# Patient Record
Sex: MALE | Race: WHITE | HISPANIC OR LATINO | URBAN - METROPOLITAN AREA
[De-identification: names, ages, dates, MRNs, and addresses within clinical notes are randomized per-mention and may not be internally consistent; named-entity substitution may affect disease eponyms.]

---

## 2022-06-05 ENCOUNTER — EMERGENCY (EMERGENCY)
Facility: HOSPITAL | Age: 27
LOS: 1 days | Discharge: HOME | End: 2022-06-05
Attending: EMERGENCY MEDICINE | Admitting: EMERGENCY MEDICINE
Payer: COMMERCIAL

## 2022-06-05 VITALS
RESPIRATION RATE: 19 BRPM | SYSTOLIC BLOOD PRESSURE: 126 MMHG | HEIGHT: 68 IN | OXYGEN SATURATION: 98 % | TEMPERATURE: 98 F | WEIGHT: 190.04 LBS | DIASTOLIC BLOOD PRESSURE: 73 MMHG | HEART RATE: 60 BPM

## 2022-06-05 DIAGNOSIS — M79.89 OTHER SPECIFIED SOFT TISSUE DISORDERS: ICD-10-CM

## 2022-06-05 DIAGNOSIS — S69.91XA UNSPECIFIED INJURY OF RIGHT WRIST, HAND AND FINGER(S), INITIAL ENCOUNTER: ICD-10-CM
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
## 2022-06-05 PROCEDURE — 99053 MED SERV 10PM-8AM 24 HR FAC: CPT

## 2022-06-05 PROCEDURE — 99283 EMERGENCY DEPT VISIT LOW MDM: CPT

## 2022-06-05 PROCEDURE — 73130 X-RAY EXAM OF HAND: CPT | Mod: 26,RT
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
## 2022-06-06 RX ORDER — ACETAMINOPHEN 500 MG
650 TABLET ORAL ONCE
Refills: 0 | Status: COMPLETED | OUTPATIENT
Start: 2022-06-06 | End: 2022-06-06

## 2022-06-06 RX ORDER — IBUPROFEN 200 MG
600 TABLET ORAL ONCE
Refills: 0 | Status: COMPLETED | OUTPATIENT
Start: 2022-06-06 | End: 2022-06-06

## 2022-06-06 RX ADMIN — Medication 600 MILLIGRAM(S): at 01:26

## 2022-06-06 RX ADMIN — Medication 650 MILLIGRAM(S): at 01:26

## 2022-06-06 NOTE — ED PROVIDER NOTE - CARE PROVIDER_API CALL
Nicholas Mancilla)  Plastic Surgery; Surgery  [address] [place] [id number]  Phone: [phone]  Fax: [phone]  Follow Up Time: 1-3 Days

## 2022-06-06 NOTE — ED PROVIDER NOTE - PHYSICAL EXAMINATION
Constitutional: awake and alert, in no acute distress  HEENT: head normocephalic and atraumatic. moist mucous membranes  Eyes: extraocular movements intact, normal conjunctiva  Neck: supple, normal ROM  Cardiovascular: regular rate   Pulmonary: no respiratory distress  Gastrointestinal: abdomen flat and nondistended  Skin: warm, dry, normal for ethnicity  Musculoskeletal: R hand: swelling and TTP over 1st metacarpal and space between 1st and 2nd metacarpals.  difficulty with thumb opposition.  no laxity of thumb on valgus strain  Neurological: oriented x4, no focal neurologic deficit.   Psychiatric: calm and cooperative

## 2022-06-06 NOTE — ED PROVIDER NOTE - CLINICAL SUMMARY MEDICAL DECISION MAKING FREE TEXT BOX
27yo M R hand dominant presents with R thumb pain after possible hyperextension injury yesterday.  On exam afebrile, VSS, well appearing, with swelling, bruising and TTP to R 1st metacarpal and space between 1st and 2nd metacarpals.  Poor thumb opposition.  No laxity with valgus strain.  XR with possible chip fracture seen on lateral view at distal 1st metacarpal.  Possible gamekeepers thumb vs. soft tissue injury vs. chip fracture without ligamentous injury.  Pt placed in thumb spica; will refer to Dr. Mancilla for follow up.

## 2022-06-06 NOTE — ED PROVIDER NOTE - NS ED ROS FT
Constitutional:  No fever, No chills, No night sweats  Eyes:  No visual changes, No discharge, No redness  ENMT:  No epistaxis, no nasal congestion, no throat pain, no difficulty swallowing  CV:  No chest pain, No palpitations, No peripheral edema  Resp:  No cough, No shortness of breath  GI:  No abdominal pain, No vomiting, No diarrhea  MSK:  No neck pain or stiffness, +joint swelling/ pain, No back pain  Neuro: no loss of consciousness, no gait abnormality, no headache, no sensory deficits, and no weakness.  Skin:  No abrasions, no lesions, no rashes  Psych:  No known mental health issues
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
## 2022-06-06 NOTE — ED PROVIDER NOTE - PATIENT PORTAL LINK FT
You can access the FollowMyHealth Patient Portal offered by [facility] by registering at the following website: [email or web address]. By joining [employer]’s FollowMyHealth portal, you will also be able to view your health information using other applications (apps) compatible with our system.

## 2022-06-06 NOTE — ED PROVIDER NOTE - NSFOLLOWUPINSTRUCTIONS_ED_ALL_ED_FT
Skier's Thumb    A hand showing a sprain, an ulnar collateral ligament injury of the thumb.   Skier's thumb is a stretched or torn ligament in the thumb from a sudden injury (acute injury). It is sometimes called gamekeeper's thumb if it developed gradually (chronic injury) from repeated overstretching of the ligaments.    Ligaments are strong bands of tissue that connect bones. The ligament that is injured (ulnar collateral ligament) connects the bones that make up the joint at the base of the thumb. A tear can be either partial or complete. The severity of the injury depends on how much of the ligament was damaged or torn. If it is not treated properly, this injury can lead to arthritis.      What are the causes?    This condition occurs when the thumb is forcefully moved past its normal range of motion toward the wrist. It may be caused by:  •Falling onto an outstretched hand. This often happens to skiers who fall with ski poles in their hands.      •Repeated movements that use the thumb, like catching a ball or other object.        What increases the risk?    You are more likely to develop this condition if:  •You had a previous thumb injury.      •You play contact sports or sports that involve catching balls, such as baseball, basketball, or football.      •You do activities where the thumb will be pulled away from the rest of the hand.      •You have poor hand strength and flexibility.      •You do not warm up properly before activities.        What are the signs or symptoms?    Symptoms of this condition include:  •Pain or tenderness.      •Swelling.      •Trouble grasping or pinching with the injured thumb.      •Bruising or redness.      If the injury is severe, a lump (mass) may be felt under the skin in the injured area.      How is this diagnosed?    This condition may be diagnosed based on:  •Your symptoms and medical history.      •A physical exam.      •Imaging tests, such as X-rays, ultrasound, or MRI.        How is this treated?    Treatment for this condition depends on the severity of your injury.  •If the ligament is overstretched or partially torn, treatment usually involves keeping your thumb in a fixed position (immobilization) for a period of time. Your health care provider will apply a brace, splint, or cast to keep your thumb from moving until it heals.      •If the ligament is fully torn, you may need surgery to reconnect the ligament to the bone. After surgery, you will need to wear a cast or splint on your thumb.      Your health care provider may also suggest exercises or physical therapy to strengthen your thumb.      Follow these instructions at home:    Medicines     •Take over-the-counter and prescription medicines only as told by your health care provider.    •Ask your health care provider if the medicine prescribed to you:  •Requires you to avoid driving or using machinery.    •Can cause constipation. You may need to take these actions to prevent or treat constipation:  •Drink enough fluid to keep your urine pale yellow.       •Take over-the-counter or prescription medicine.      •Eat foods that are high in fiber, such as beans, whole grains, and fresh fruits and vegetables.      •Limit foods that are high in fat and processed sugars, such as fried or sweet foods.           If you have a nonremovable cast:     • Do not put pressure on any part of the cast until it is fully hardened. This may take several hours.      • Do not stick anything inside the cast to scratch your skin. Doing that increases your risk of infection.      •Check the skin around the cast every day. Tell your health care provider about any concerns.      •You may put lotion on dry skin around the edges of the cast. Do not put lotion on the skin underneath the cast.      •Keep the cast clean and dry.      If you have a removable splint or brace:     •Wear the splint or brace as told by your health care provider. Remove it only as told by your health care provider.      •Check the skin around the splint or brace every day. Tell your health care provider about any concerns.      •Loosen the splint or brace if your fingers tingle, become numb, or turn cold and blue.      •Keep the splint or brace clean and dry.      Bathing     • Do not take baths, swim, or use a hot tub until your health care provider approves. Ask your health care provider if you may take showers. You may only be allowed to take sponge baths.    •If your cast, splint, or brace is not waterproof:  •Do not let it get wet.      •Cover it with a watertight covering when you take a bath or shower.          Managing pain, stiffness, and swelling   Bag of ice on a towel on the skin. •If directed, put ice on the injured area. To do this:  •If you have a removable splint or brace, remove it as told by your health care provider.      •Put ice in a plastic bag.      •Place a towel between your skin and the bag or between your cast and the bag.      •Leave the ice on for 20 minutes, 2–3 times a day.      •Remove the ice if your skin turns bright red. This is very important. If you cannot feel pain, heat, or cold, you have a greater risk of damage to the area.        •Move your fingers often to reduce stiffness and swelling.      •Raise (elevate) the injured area above the level of your heart while you are sitting or lying down.      Activity     •Return to your normal activities as told by your health care provider. Ask your health care provider what activities are safe for you.      •Do exercises as told by your health care provider or physical therapist.      General instructions     •Ask your health care provider when it is safe to drive if you have a cast, splint, or brace on your hand.      • Do not wear rings on your injured thumb.      •Keep all follow-up visits. This is important.        Contact a health care provider if:    •Your pain is not controlled with medicine.      •Your bruising or swelling gets worse.      •Your cast or splint is damaged.      •Your thumb is numb and feels colder to the touch than normal.        Get help right away if:    •You have severe pain.      •Your thumb is pale or blue.        Summary    •Skier's thumb is a stretched or torn ligament in the thumb.      •This injury can happen suddenly (acute) or may develop gradually (chronic).      •Treatment usually involves wearing a cast, splint, or brace on your thumb. Surgery may be needed if the ligament is fully torn.      This information is not intended to replace advice given to you by your health care provider. Make sure you discuss any questions you have with your health care provider.        Finger Sprain, Adult      A finger sprain is a tear or stretch in a ligament in a finger. Ligaments are tissues that connect bones to each other.      What are the causes?    Finger sprains happen when something makes the bones in the hand move in an abnormal way. They are often caused by a fall or an accident.      What increases the risk?    This condition is more likely to develop in people who:  •Participate in sports in which it is easy to fall, such as skiing.      •Play sports that involve catching an object, such as basketball.      •Have poor strength and flexibility.        What are the signs or symptoms?    Symptoms of this condition include:  •Pain or tenderness in the finger.      •Swelling in the finger.      •A bluish appearance to the finger.      •Bruising.      •Difficulty bending and flexing the finger.        How is this diagnosed?    This condition is diagnosed with an exam of your finger. Your health care provider may take an X-ray to see if any bones are broken or dislocated.      How is this treated?  Hand showing finger splint on index and middle fingers and wrist.   Treatment for this condition depends on how severe the sprain is. It may involve:  •Preventing the finger from moving for a period of time. Your finger may be wrapped in a bandage (dressing) or splint, or your finger may be taped to the fingers beside it (wendy taping).      •Medicines for pain.      •Exercises to strengthen the finger. These may be recommended when the finger has healed.      •Surgery to reconnect the ligament to a bone. This may be done if the ligament was completely torn.        Follow these instructions at home:    If you have a removable splint:     •Wear the splint as told by your health care provider. Remove it only as told by your health care provider.      •Check the skin around the splint every day. Tell your health care provider about any concerns.      •Loosen the splint if your fingers tingle, become numb, or turn cold and blue.      •Keep the splint clean.    •If the splint is not waterproof:  •Do not let it get wet.      •Cover it with a watertight covering when you take a bath or shower.        Managing pain, stiffness, and swelling   •If directed, put ice on the injured area. To do this:  •If you have a removable splint, remove it as told by your health care provider.      •Put ice in a plastic bag.      •Place a towel between your skin and the bag.      •Leave the ice on for 20 minutes, 2–3 times a day.      •Remove the ice if your skin turns bright red. This is very important. If you cannot feel pain, heat, or cold, you have a greater risk of damage to the area.        •Move your fingers often to reduce stiffness and swelling.      •Raise (elevate) the injured area above the level of your heart while you are sitting or lying down.      Medicines     •Take over-the-counter and prescription medicines only as told by your health care provider.      •Ask your health care provider if the medicine prescribed to you requires you to avoid driving or using machinery.      General instructions     •Keep any dressings dry until your health care provider says they can be removed.      •If your fingers are wendy taped, replace your wendy taping as told by your health care provider.      •Do exercises as told by your health care provider or physical therapist.      • Do not wear rings on your injured finger.      •Keep all follow-up visits. This is important.        Contact a health care provider if:    •Your pain is not controlled with medicine.      •Your bruising or swelling gets worse.      •Your splint is damaged.      •You develop a fever.        Get help right away if:    •Your finger is numb or blue.      •Your finger feels colder to the touch than normal.        Summary    •A finger sprain is a tear or stretch in a ligament in a finger. Ligaments are tissues that connect bones to each other.      •Finger sprains happen when something makes the bones in the hand move in an abnormal way. They are often caused by a fall or accident.      •This condition is diagnosed with an exam of your finger. Your health care provider may do an X-ray to see if any bones are broken or dislocated.      •Treatment for this condition depends on how severe the sprain is. Treatment may involve wendy taping or wearing a splint. Surgery to reconnect the ligament to a bone may be needed if the ligament was torn all the way.      This information is not intended to replace advice given to you by your health care provider. Make sure you discuss any questions you have with your health care provider.

## 2022-06-06 NOTE — ED PROVIDER NOTE - CHIEF COMPLAINT
The patient is a 26y Male complaining of hand pain/injury. Cool and warm liquids that are not irritating to the throat should be given for the first day or two. Avoid hot liquids. Avoid citrus juices and milk. Advance at your own pace starting with soft foods and advancing to a regular diet. Avoid rough and scratchy foods and foods that are difficult to chew for approximately 5 days. Avoid strenous exercise

## 2022-09-14 ENCOUNTER — EMERGENCY (EMERGENCY)
Facility: HOSPITAL | Age: 27
LOS: 1 days | Discharge: HOME | End: 2022-09-14
Attending: EMERGENCY MEDICINE | Admitting: EMERGENCY MEDICINE

## 2022-09-14 VITALS
HEIGHT: 68 IN | RESPIRATION RATE: 19 BRPM | TEMPERATURE: 98 F | OXYGEN SATURATION: 95 % | SYSTOLIC BLOOD PRESSURE: 102 MMHG | HEART RATE: 97 BPM | DIASTOLIC BLOOD PRESSURE: 64 MMHG | WEIGHT: 190.04 LBS
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
## 2022-09-14 DIAGNOSIS — R07.81 PLEURODYNIA: ICD-10-CM
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
## 2022-09-14 PROCEDURE — 99284 EMERGENCY DEPT VISIT MOD MDM: CPT | Mod: 25

## 2022-09-14 PROCEDURE — 99053 MED SERV 10PM-8AM 24 HR FAC: CPT

## 2022-09-14 PROCEDURE — 71101 X-RAY EXAM UNILAT RIBS/CHEST: CPT | Mod: 26,RT

## 2022-09-14 NOTE — ED ADULT TRIAGE NOTE - CHIEF COMPLAINT QUOTE
Pt presents to ed reporting right sided rib pain s/p fall in shower x 4 hrs ago, falling onto right side of ribs. no signs of respiratory distress/lungs clear to ausc in triage. no head strike

## 2022-09-14 NOTE — ED ADULT TRIAGE NOTE - RESPIRATORY RATE (BREATHS/MIN)
-Continue current medications      -Recommend 150 minutes of cardiovascular activity a week, or 10,000 to 15,000 steps a day, 2 days of weightbearing  -dietary wise, try to stick to your weight x 10 in calories a day  -avoid processed/refined carbohydrates (boxed/canned/frozen/fast)  -encourage healthy protein and fat, butter, avocado, egg    -When blood work results will touch base about MRI's from a few years ago and follow up if necessary    -Follow up in 6 months for annual medicare well visit
19

## 2022-09-14 NOTE — ED ADULT TRIAGE NOTE - NS ED TRIAGE AVPU SCALE
Luis Ramesh
Alert-The patient is alert, awake and responds to voice. The patient is oriented to time, place, and person. The triage nurse is able to obtain subjective information.

## 2022-09-15 PROCEDURE — 71101 X-RAY EXAM UNILAT RIBS/CHEST: CPT | Mod: 26,RT

## 2022-09-15 RX ORDER — ACETAMINOPHEN 500 MG
650 TABLET ORAL ONCE
Refills: 0 | Status: COMPLETED | OUTPATIENT
Start: 2022-09-15 | End: 2022-09-15

## 2022-09-15 RX ADMIN — Medication 650 MILLIGRAM(S): at 00:41

## 2022-09-15 NOTE — ED ADULT NURSE NOTE - OBJECTIVE STATEMENT
Patient states he slipped and fell in the shower today and landed on the side of the bathtub injuring his right side

## 2022-09-15 NOTE — ED PROVIDER NOTE - CLINICAL SUMMARY MEDICAL DECISION MAKING FREE TEXT BOX
Pt here with R lateral rib pain after slip and fall in shower, striking R chest wall on the side of the tub.  On exam pt satting well on RA, has point tenderness over R lateral ribs.  XR neg for fx.  Stable for dc w incentive spirometer.

## 2022-09-15 NOTE — ED PROVIDER NOTE - PHYSICAL EXAMINATION
Constitutional: awake and alert, in no acute distress  HEENT: head normocephalic and atraumatic. moist mucous membranes  Eyes: extraocular movements intact, normal conjunctiva  Neck: supple, normal ROM  Cardiovascular: regular rate   Pulmonary: no respiratory distress  Gastrointestinal: abdomen flat and nondistended  Skin: warm, dry, normal for ethnicity  Musculoskeletal: TTP over R lateral ribs.  no edema, no deformity  Neurological: oriented x4, no focal neurologic deficit.   Psychiatric: calm and cooperative

## 2022-09-15 NOTE — ED ADULT NURSE NOTE - NSIMPLEMENTINTERV_GEN_ALL_ED
Implemented All Fall Risk Interventions:  [place] to call system. Call bell, personal items and telephone within reach. Instruct patient to call for assistance. Room bathroom lighting operational. Non-slip footwear when patient is off stretcher. Physically safe environment: no spills, clutter or unnecessary equipment. Stretcher in lowest position, wheels locked, appropriate side rails in place. Provide visual cue, wrist band, yellow gown, etc. Monitor gait and stability. Monitor for mental status changes and reorient to person, place, and time. Review medications for side effects contributing to fall risk. Reinforce activity limits and safety measures with patient and family.

## 2022-09-15 NOTE — ED PROVIDER NOTE - NSFOLLOWUPINSTRUCTIONS_ED_ALL_ED_FT
Chest Wall Pain      Chest wall pain is pain in or around the bones and muscles of your chest. Chest wall pain may be caused by:  •An injury.      •Coughing a lot.      •Using your chest and arm muscles too much.      Sometimes, the cause may not be known. This pain may take a few weeks or longer to get better.      Follow these instructions at home:      Managing pain, stiffness, and swelling   A bag of ice on a towel on the skin   If told, put ice on the painful area:  •Put ice in a plastic bag.      •Place a towel between your skin and the bag.      •Leave the ice on for 20 minutes, 2–3 times a day.      Activity     •Rest as told by your doctor.      •Avoid doing things that cause pain. This includes lifting heavy items.      •Ask your doctor what activities are safe for you.        General instructions   A do not smoke cigarettes sign.    •Take over-the-counter and prescription medicines only as told by your doctor.      • Do not use any products that contain nicotine or tobacco, such as cigarettes, e-cigarettes, and chewing tobacco. If you need help quitting, ask your doctor.      •Keep all follow-up visits as told by your doctor. This is important.        Contact a doctor if:    •You have a fever.      •Your chest pain gets worse.      •You have new symptoms.        Get help right away if:    •You feel sick to your stomach (nauseous) or you throw up (vomit).      •You feel sweaty or light-headed.      •You have a cough with mucus from your lungs (sputum) or you cough up blood.      •You are short of breath.      These symptoms may be an emergency. Do not wait to see if the symptoms will go away. Get medical help right away. Call your local emergency services (911 in the [place] Do not drive yourself to the hospital.       Summary    •Chest wall pain is pain in or around the bones and muscles of your chest.      •It may be treated with ice, rest, and medicines. Your condition may also get better if you avoid doing things that cause pain.      •Contact a doctor if you have a fever, chest pain that gets worse, or new symptoms.      •Get help right away if you feel light-headed or you get short of breath. These symptoms may be an emergency.      This information is not intended to replace advice given to you by your health care provider. Make sure you discuss any questions you have with your health care provider.

## 2022-09-15 NOTE — ED PROVIDER NOTE - OBJECTIVE STATEMENT
27yo M no pmh presents with R rib pain after slip and fall in the shower earlier today, striking R ribs on edge of tub.  No SOB.  NO open wounds.  No headstrike or LOC.  No neck pain or back pain.  No abd pain.

## 2022-09-15 NOTE — ED PROVIDER NOTE - PATIENT PORTAL LINK FT
You can access the FollowMyHealth Patient Portal offered by [facility] by registering at the following website: [email or web address]. By joining [employer]’s FollowMyHealth portal, you will also be able to view your health information using other applications (apps) compatible with our system.

## 2023-05-04 NOTE — ED ADULT TRIAGE NOTE - TEMPERATURE IN CELSIUS (DEGREES C)
Pt started with croupy cough and decreased appetite today.      Triage Assessment     Row Name 05/04/23 3681       Triage Assessment (Pediatric)    Airway WDL WDL       Respiratory WDL    Respiratory WDL X;cough;all    Cough Type croupy       Skin Circulation/Temperature WDL    Skin Circulation/Temperature WDL WDL       Cardiac WDL    Cardiac WDL WDL       Peripheral/Neurovascular WDL    Peripheral Neurovascular WDL WDL       Cognitive/Neuro/Behavioral WDL    Cognitive/Neuro/Behavioral WDL WDL              
36.7

## 2023-06-19 ENCOUNTER — EMERGENCY (EMERGENCY)
Facility: HOSPITAL | Age: 28
LOS: 1 days | Discharge: HOME | End: 2023-06-19
Attending: EMERGENCY MEDICINE | Admitting: EMERGENCY MEDICINE
Payer: COMMERCIAL

## 2023-06-19 VITALS
DIASTOLIC BLOOD PRESSURE: 66 MMHG | OXYGEN SATURATION: 98 % | HEART RATE: 687 BPM | RESPIRATION RATE: 16 BRPM | TEMPERATURE: 99 F | SYSTOLIC BLOOD PRESSURE: 109 MMHG

## 2023-06-19 LAB
FLUAV H1 2009 PAND RNA SPEC QL NAA+PROBE: SIGNIFICANT CHANGE UP
FLUAV H1 RNA SPEC QL NAA+PROBE: SIGNIFICANT CHANGE UP
FLUAV H3 RNA SPEC QL NAA+PROBE: SIGNIFICANT CHANGE UP
FLUAV SUBTYP SPEC NAA+PROBE: SIGNIFICANT CHANGE UP
FLUBV RNA SPEC QL NAA+PROBE: SIGNIFICANT CHANGE UP
RAPID RVP RESULT: SIGNIFICANT CHANGE UP
SARS-COV-2 RNA SPEC QL NAA+PROBE: SIGNIFICANT CHANGE UP

## 2023-06-19 PROCEDURE — 99284 EMERGENCY DEPT VISIT MOD MDM: CPT

## 2023-06-19 PROCEDURE — 71046 X-RAY EXAM CHEST 2 VIEWS: CPT | Mod: 26

## 2023-06-19 RX ORDER — ALBUTEROL 90 UG/1
1 AEROSOL, METERED ORAL ONCE
Refills: 0 | Status: COMPLETED | OUTPATIENT
Start: 2023-06-19 | End: 2023-06-19

## 2023-06-19 RX ADMIN — ALBUTEROL 1 PUFF(S): 90 AEROSOL, METERED ORAL at 21:19

## 2023-06-19 RX ADMIN — Medication 100 MILLIGRAM(S): at 21:18

## 2023-06-19 NOTE — ED ADULT NURSE NOTE - OBJECTIVE STATEMENT
Pt c/o coughing spells, pt states he feels like he is going to suffocate , reports having 2 episodes

## 2023-06-19 NOTE — ED PROVIDER NOTE - PATIENT PORTAL LINK FT
You can access the FollowMyHealth Patient Portal offered by [facility] by registering at the following website: [email or web address]. By joining [employer]’s FollowMyHealth portal, you will also be able to view your health information using other applications (apps) compatible with our system.

## 2023-06-19 NOTE — ED PROVIDER NOTE - PROVIDER TOKENS
PROVIDER:[TOKEN:[[id number]]],PROVIDER:[TOKEN:[[id number]]],PROVIDER:[TOKEN:[[id number]]],PROVIDER:[TOKEN:[[id number]]]

## 2023-06-19 NOTE — ED PROVIDER NOTE - NSPTACCESSSVCSAPPTDETAILS_ED_ALL_ED_FT
Dr Weber pulmonary, Dr Escalante cardio and Dr José/Sindy for chest pain and coughing in adult and needs primary care.

## 2023-06-19 NOTE — ED PROVIDER NOTE - NSFOLLOWUPINSTRUCTIONS_ED_ALL_ED_FT
Please follow up with primary care, pulmonary and cardiology.  Return if you have any concerns.  Your test results are attached.

## 2023-06-19 NOTE — ED PROVIDER NOTE - CARE PROVIDER_API CALL
Renae Weber  Pulmonary Disease  [address] [place] [address]  Phone: [phone]  Fax: [phone]  Follow Up Time:     Yovany Escalante  Cardiovascular Disease  [address] [place] [address]  Phone: [phone]  Fax: [phone]  Follow Up Time:     Marvin Callahan  Internal Medicine  [address], [place] [address]  Phone: [phone]  Fax: [phone]  Follow Up Time:     Gonzalo José  Family Medicine  [address], [place] [address]  Phone: [phone]  Fax: [phone]  Follow Up Time:

## 2023-06-19 NOTE — ED ADULT NURSE NOTE - CARDIO ASSESSMENT
Oxybutynin Pregnancy And Lactation Text: This medication is Pregnancy Category B and is considered safe during pregnancy. It is unknown if it is excreted in breast milk. ---

## 2023-06-19 NOTE — ED PROVIDER NOTE - CLINICAL SUMMARY MEDICAL DECISION MAKING FREE TEXT BOX
paroxysms of coughing three times since yesterday.  CXR and viral panel neg.  Follow up with primary care and pulm and cardiology.

## 2023-06-19 NOTE — ED PROVIDER NOTE - OBJECTIVE STATEMENT
27 male with episode of coughing three times since yesterday, patient requests viral panel, will also refer to primary care and pulmonary

## 2023-06-23 DIAGNOSIS — Z20.822 CONTACT WITH AND (SUSPECTED) EXPOSURE TO COVID-19: ICD-10-CM

## 2023-06-23 DIAGNOSIS — R05.1 ACUTE COUGH: ICD-10-CM

## 2024-02-29 NOTE — ED ADULT NURSE NOTE - DRUG PRE-SCREENING (DAST -1)
ED Follow Up Call/ Schedule appt   ED: [facility]  Reason: Nausea, Vomiting  Date:2/27/2024    Appt scheduled: NA      Comments:   LMOM for pt to return phone call to the office to sched an ED follow up    Future Appointments   Date Time Provider Department Center   3/11/2024 11:00 AM Maki Dillon APRN - CNP [facility]       
Statement Selected

## 2024-03-05 ENCOUNTER — EMERGENCY (EMERGENCY)
Facility: HOSPITAL | Age: 29
LOS: 1 days | Discharge: HOME | End: 2024-03-05
Attending: EMERGENCY MEDICINE | Admitting: EMERGENCY MEDICINE
Payer: COMMERCIAL

## 2024-03-05 VITALS
TEMPERATURE: 98 F | SYSTOLIC BLOOD PRESSURE: 118 MMHG | HEART RATE: 85 BPM | RESPIRATION RATE: 16 BRPM | OXYGEN SATURATION: 97 % | DIASTOLIC BLOOD PRESSURE: 67 MMHG

## 2024-03-05 PROCEDURE — 99283 EMERGENCY DEPT VISIT LOW MDM: CPT

## 2024-03-05 NOTE — ED PROVIDER NOTE - PATIENT PORTAL LINK FT
You can access the FollowMyHealth Patient Portal offered by [facility] by registering at the following website: [email or web address]. By joining [employer]’s FollowMyHealth portal, you will also be able to view your health information using other applications (apps) compatible with our system.

## 2024-03-05 NOTE — ED PROVIDER NOTE - OBJECTIVE STATEMENT
28-year-old male presenting stating he needs to have earwax from his left ear removed.  He was seen at an outside hospital several days ago to have cerumen removed from his right ear.  He was told that it could not be removed from his left ear because they did not have appropriate tools.  He was instructed to go to another hospital to have the cerumen removed from the left side.  He reports feeling like the ears are clear but he wants to make sure all of the earwax has been removed to avoid loss of hearing that he experienced prior.  Denies drainage from the ear.

## 2024-03-05 NOTE — ED ADULT NURSE NOTE - NSICDXPASTMEDICALHX_GEN_ALL_CORE_FT
PAST MEDICAL HISTORY:  No pertinent past medical history     
Clear bilaterally, pupils equal, round and reactive to light.

## 2024-03-05 NOTE — ED ADULT TRIAGE NOTE - CHIEF COMPLAINT QUOTE
Pt was seen at an outside hospital a few days ago for ear wax irrigation to left ear, pt was instructed to have follow up and potentially additional irrigation to same ear. Pt reports decreased hearing but no discharge.

## 2024-03-05 NOTE — ED PROVIDER NOTE - PHYSICAL EXAMINATION
VITAL SIGNS: I have reviewed nursing notes and confirm.  CONSTITUTIONAL: Well-developed; well-nourished; in no acute distress.  HEAD: Normocephalic; atraumatic.  EYES: EOM intact; conjunctiva and sclera clear.  ENT: nose appears normal. Right EAC clear and TM appears normal.  Left TM with minimal cerumen along the posterior EAC wall.  TM visible and appears normal.  NECK: Supple  RESP: breathing comfortably on RA  EXT: No deformities  PSYCH: Cooperative, appropriate.

## 2024-03-05 NOTE — ED PROVIDER NOTE - CLINICAL SUMMARY MEDICAL DECISION MAKING FREE TEXT BOX
28-year-old male presenting requesting cerumen be removed from his left EAC.  He does indeed have minimal cerumen but it does not appear impacted or obstructing.  I was able to remove the cerumen using an EAC scope.  The patient tolerated this well.  Will discharge with recommendations to avoid using cotton swabs in the ear and to follow-up with an ENT clinic or office as needed.

## 2024-03-06 PROBLEM — Z78.9 OTHER SPECIFIED HEALTH STATUS: Chronic | Status: ACTIVE | Noted: 2023-06-21

## 2024-03-07 DIAGNOSIS — H61.21 IMPACTED CERUMEN, RIGHT EAR: ICD-10-CM

## 2024-03-07 DIAGNOSIS — H61.22 IMPACTED CERUMEN, LEFT EAR: ICD-10-CM

## 2024-10-10 ENCOUNTER — EMERGENCY (EMERGENCY)
Facility: HOSPITAL | Age: 29
LOS: 1 days | Discharge: HOME | End: 2024-10-10
Attending: STUDENT IN AN ORGANIZED HEALTH CARE EDUCATION/TRAINING PROGRAM | Admitting: STUDENT IN AN ORGANIZED HEALTH CARE EDUCATION/TRAINING PROGRAM
Payer: COMMERCIAL

## 2024-10-10 VITALS
RESPIRATION RATE: 16 BRPM | DIASTOLIC BLOOD PRESSURE: 79 MMHG | TEMPERATURE: 98 F | SYSTOLIC BLOOD PRESSURE: 120 MMHG | HEART RATE: 80 BPM | OXYGEN SATURATION: 98 %

## 2024-10-10 LAB
FLUAV AG NPH QL: SIGNIFICANT CHANGE UP
FLUBV AG NPH QL: SIGNIFICANT CHANGE UP
RSV RNA NPH QL NAA+NON-PROBE: SIGNIFICANT CHANGE UP
SARS-COV-2 RNA SPEC QL NAA+PROBE: SIGNIFICANT CHANGE UP

## 2024-10-10 PROCEDURE — 71046 X-RAY EXAM CHEST 2 VIEWS: CPT | Mod: 26

## 2024-10-10 PROCEDURE — 99284 EMERGENCY DEPT VISIT MOD MDM: CPT

## 2024-10-10 RX ORDER — PREDNISONE 5 MG/1
1 TABLET ORAL
Qty: 5 | Refills: 0
Start: 2024-10-10

## 2024-10-10 RX ORDER — AZITHROMYCIN 250 MG/1
1 TABLET, FILM COATED ORAL
Qty: 3 | Refills: 0
Start: 2024-10-10 | End: 2024-10-12

## 2024-10-10 NOTE — ED PROVIDER NOTE - PATIENT PORTAL LINK FT
You can access the FollowMyHealth Patient Portal offered by [facility] by registering at the following website: [email or web address]. By joining [employer]’s FollowMyHealth portal, you will also be able to view your health information using other applications (apps) compatible with our system.

## 2024-10-10 NOTE — ED PROVIDER NOTE - OBJECTIVE STATEMENT
Patient is a 29y/o M p/w c/o sore throat and cough x 5 days. Patient states that he was in his usual state of health up until 4-5 days prior to presentation when he began to have the above symptoms. He denies any fever, chills, n/v/d/c. The patient denies any recent travel or known sick contact.

## 2024-10-10 NOTE — ED PROVIDER NOTE - CLINICAL SUMMARY MEDICAL DECISION MAKING FREE TEXT BOX
Patient with symptoms of likely viral pharyngitis, persistent cough, will provide supportive care and 3 day course of azithromycin.

## 2024-10-14 DIAGNOSIS — J02.9 ACUTE PHARYNGITIS, UNSPECIFIED: ICD-10-CM

## 2024-10-14 DIAGNOSIS — R05.9 COUGH, UNSPECIFIED: ICD-10-CM

## 2025-08-30 ENCOUNTER — EMERGENCY (EMERGENCY)
Facility: HOSPITAL | Age: 30
LOS: 1 days | End: 2025-08-30
Attending: EMERGENCY MEDICINE | Admitting: EMERGENCY MEDICINE
Payer: MEDICAID

## 2025-08-30 VITALS
RESPIRATION RATE: 18 BRPM | OXYGEN SATURATION: 100 % | DIASTOLIC BLOOD PRESSURE: 60 MMHG | SYSTOLIC BLOOD PRESSURE: 132 MMHG | HEART RATE: 109 BPM | TEMPERATURE: 99 F

## 2025-08-30 VITALS
HEIGHT: 68 IN | DIASTOLIC BLOOD PRESSURE: 93 MMHG | WEIGHT: 190.04 LBS | TEMPERATURE: 99 F | SYSTOLIC BLOOD PRESSURE: 167 MMHG | OXYGEN SATURATION: 98 % | RESPIRATION RATE: 17 BRPM | HEART RATE: 105 BPM

## 2025-08-30 LAB
ALBUMIN SERPL ELPH-MCNC: 1.6 G/DL — LOW (ref 3.4–5)
ALP SERPL-CCNC: 84 U/L — SIGNIFICANT CHANGE UP (ref 40–120)
ALT FLD-CCNC: 58 U/L — HIGH (ref 12–42)
ANION GAP SERPL CALC-SCNC: 3 MMOL/L — LOW (ref 9–16)
AST SERPL-CCNC: 41 U/L — HIGH (ref 15–37)
BILIRUB SERPL-MCNC: 0.6 MG/DL — SIGNIFICANT CHANGE UP (ref 0.2–1.2)
BUN SERPL-MCNC: 25 MG/DL — HIGH (ref 7–23)
CALCIUM SERPL-MCNC: 10.4 MG/DL — SIGNIFICANT CHANGE UP (ref 8.5–10.5)
CHLORIDE SERPL-SCNC: 103 MMOL/L — SIGNIFICANT CHANGE UP (ref 96–108)
CO2 SERPL-SCNC: 33 MMOL/L — HIGH (ref 22–31)
CREAT SERPL-MCNC: 1.04 MG/DL — SIGNIFICANT CHANGE UP (ref 0.5–1.3)
EGFR: 101 ML/MIN/1.73M2 — SIGNIFICANT CHANGE UP
EGFR: 101 ML/MIN/1.73M2 — SIGNIFICANT CHANGE UP
GLUCOSE SERPL-MCNC: 103 MG/DL — HIGH (ref 70–99)
HCT VFR BLD CALC: 45.2 % — SIGNIFICANT CHANGE UP (ref 39–50)
HGB BLD-MCNC: 15.4 G/DL — SIGNIFICANT CHANGE UP (ref 13–17)
MCHC RBC-ENTMCNC: 31.9 PG — SIGNIFICANT CHANGE UP (ref 27–34)
MCHC RBC-ENTMCNC: 34.1 G/DL — SIGNIFICANT CHANGE UP (ref 32–36)
MCV RBC AUTO: 93.6 FL — SIGNIFICANT CHANGE UP (ref 80–100)
NRBC # BLD AUTO: 0 K/UL — SIGNIFICANT CHANGE UP (ref 0–0)
NRBC # FLD: 0 K/UL — SIGNIFICANT CHANGE UP (ref 0–0)
NRBC BLD AUTO-RTO: 0 /100 WBCS — SIGNIFICANT CHANGE UP (ref 0–0)
PLATELET # BLD AUTO: 196 K/UL — SIGNIFICANT CHANGE UP (ref 150–400)
PMV BLD: 10.2 FL — SIGNIFICANT CHANGE UP (ref 7–13)
POTASSIUM SERPL-MCNC: 4.6 MMOL/L — SIGNIFICANT CHANGE UP (ref 3.5–5.3)
POTASSIUM SERPL-SCNC: 4.6 MMOL/L — SIGNIFICANT CHANGE UP (ref 3.5–5.3)
PROT SERPL-MCNC: 8.3 G/DL — HIGH (ref 6.4–8.2)
RBC # BLD: 4.83 M/UL — SIGNIFICANT CHANGE UP (ref 4.2–5.8)
RBC # FLD: 12 % — SIGNIFICANT CHANGE UP (ref 10.3–14.5)
SODIUM SERPL-SCNC: 139 MMOL/L — SIGNIFICANT CHANGE UP (ref 132–145)
WBC # BLD: 10.21 K/UL — SIGNIFICANT CHANGE UP (ref 3.8–10.5)
WBC # FLD AUTO: 10.21 K/UL — SIGNIFICANT CHANGE UP (ref 3.8–10.5)

## 2025-08-30 PROCEDURE — 71046 X-RAY EXAM CHEST 2 VIEWS: CPT | Mod: 26

## 2025-08-30 PROCEDURE — 99284 EMERGENCY DEPT VISIT MOD MDM: CPT

## 2025-09-02 DIAGNOSIS — R07.81 PLEURODYNIA: ICD-10-CM
